# Patient Record
(demographics unavailable — no encounter records)

---

## 2024-10-23 NOTE — PHYSICAL EXAM
[Alert] : alert [No Acute Distress] : no acute distress [Well Nourished] : well nourished [Healthy Appearance] : healthy appearance [Normal Hearing] : hearing was normal [No Neck Mass] : no neck mass was observed [Supple] : the neck was supple [No Respiratory Distress] : no respiratory distress [No Accessory Muscle Use] : no accessory muscle use [Normal Gait] : normal gait [No Rash] : no rash [Oriented x3] : oriented to person, place, and time [Normal Insight/Judgement] : insight and judgment were intact [Normal Affect] : the affect was normal [Normal Mood] : the mood was normal [Fully active, able to carry on all pre-disease performance without restriction] : Fully active, able to carry on all pre-disease performance without restriction [PERRL] : pupils equal, round and reactive to light [de-identified] : Palpable radial/ulnar. [de-identified] : L fifth digit ulcerated lesion with swelling and bluish soft tissue bundle.

## 2024-10-23 NOTE — ASSESSMENT
[Venous malformation] : venous malformation [FreeTextEntry1] : 25yo old F  with L fifth digit lesion for 3 months presents for consultation. She first noticed the lesion 3 months ago and it has steadily become larger. It is not painful but does cause her some discomfort due to the swelling around the lesion and it occasionally bleeds. Per patient she does not recall any bites or trauma to the area and she does not recall it being there prior to 3 months ago. She saw an Urgent Care provider who initially thought it could be infectious in origin and incised the lesion which caused it to bleed. She's seeking second opinion given the intermittent bleeding concerning for vascular malformation.  Plan:  -The onset of the lesion is abnormal as AVMs are usually congenital in nature. Under bedside u/s it is difficult to ascertain whether this is as true AVM  -Please obtain MRI/MRA of the lesion to further delineate anatomy and whether there is vascular component to the lesion

## 2024-10-23 NOTE — PHYSICAL EXAM
[Alert] : alert [No Acute Distress] : no acute distress [Well Nourished] : well nourished [Healthy Appearance] : healthy appearance [Normal Hearing] : hearing was normal [No Neck Mass] : no neck mass was observed [Supple] : the neck was supple [No Respiratory Distress] : no respiratory distress [No Accessory Muscle Use] : no accessory muscle use [Normal Gait] : normal gait [No Rash] : no rash [Oriented x3] : oriented to person, place, and time [Normal Insight/Judgement] : insight and judgment were intact [Normal Affect] : the affect was normal [Normal Mood] : the mood was normal [Fully active, able to carry on all pre-disease performance without restriction] : Fully active, able to carry on all pre-disease performance without restriction [PERRL] : pupils equal, round and reactive to light [de-identified] : Palpable radial/ulnar. [de-identified] : L fifth digit ulcerated lesion with swelling and bluish soft tissue bundle.

## 2024-10-23 NOTE — PHYSICAL EXAM
[Alert] : alert [No Acute Distress] : no acute distress [Well Nourished] : well nourished [Healthy Appearance] : healthy appearance [Normal Hearing] : hearing was normal [No Neck Mass] : no neck mass was observed [Supple] : the neck was supple [No Respiratory Distress] : no respiratory distress [No Accessory Muscle Use] : no accessory muscle use [Normal Gait] : normal gait [No Rash] : no rash [Oriented x3] : oriented to person, place, and time [Normal Insight/Judgement] : insight and judgment were intact [Normal Affect] : the affect was normal [Normal Mood] : the mood was normal [Fully active, able to carry on all pre-disease performance without restriction] : Fully active, able to carry on all pre-disease performance without restriction [PERRL] : pupils equal, round and reactive to light [de-identified] : Palpable radial/ulnar. [de-identified] : L fifth digit ulcerated lesion with swelling and bluish soft tissue bundle.

## 2024-10-23 NOTE — ASSESSMENT
[Venous malformation] : venous malformation [FreeTextEntry1] : 27yo old F  with L fifth digit lesion for 3 months presents for consultation. She first noticed the lesion 3 months ago and it has steadily become larger. It is not painful but does cause her some discomfort due to the swelling around the lesion and it occasionally bleeds. Per patient she does not recall any bites or trauma to the area and she does not recall it being there prior to 3 months ago. She saw an Urgent Care provider who initially thought it could be infectious in origin and incised the lesion which caused it to bleed. She's seeking second opinion given the intermittent bleeding concerning for vascular malformation.  Plan:  -The onset of the lesion is abnormal as AVMs are usually congenital in nature. Under bedside u/s it is difficult to ascertain whether this is as true AVM  -Please obtain MRI/MRA of the lesion to further delineate anatomy and whether there is vascular component to the lesion

## 2024-10-23 NOTE — HISTORY OF PRESENT ILLNESS
[FreeTextEntry1] : Marci Pena is a 26-year-old woman (last pregnancy 2 years ago) who is presenting for consultation for a L fifth digit lesion.

## 2024-10-23 NOTE — PHYSICAL EXAM
[Alert] : alert [No Acute Distress] : no acute distress [Well Nourished] : well nourished [Healthy Appearance] : healthy appearance [Normal Hearing] : hearing was normal [No Neck Mass] : no neck mass was observed [Supple] : the neck was supple [No Respiratory Distress] : no respiratory distress [No Accessory Muscle Use] : no accessory muscle use [Normal Gait] : normal gait [No Rash] : no rash [Oriented x3] : oriented to person, place, and time [Normal Insight/Judgement] : insight and judgment were intact [Normal Affect] : the affect was normal [Normal Mood] : the mood was normal [Fully active, able to carry on all pre-disease performance without restriction] : Fully active, able to carry on all pre-disease performance without restriction [PERRL] : pupils equal, round and reactive to light [de-identified] : Palpable radial/ulnar. [de-identified] : L fifth digit ulcerated lesion with swelling and bluish soft tissue bundle.

## 2024-10-23 NOTE — END OF VISIT
[Time Spent: ___ minutes] : I have spent [unfilled] minutes of time on the encounter which excludes teaching and separately reported services. [FreeTextEntry3] : I, Dr. Scherer, personally performed the evaluation and management (E/M) services for this established patient who presents today with (a) new problem(s)/exacerbation of (an) existing condition(s). That E/M includes conducting the examination, assessing all new/exacerbated conditions, and establishing a new plan of care. Today, my PA, Mary Quiros PA-C, was here to observe my evaluation and management services for this new problem/exacerbated condition to be followed going forward.